# Patient Record
Sex: FEMALE | Race: WHITE | HISPANIC OR LATINO
[De-identification: names, ages, dates, MRNs, and addresses within clinical notes are randomized per-mention and may not be internally consistent; named-entity substitution may affect disease eponyms.]

---

## 2023-04-04 PROBLEM — Z00.00 ENCOUNTER FOR PREVENTIVE HEALTH EXAMINATION: Status: ACTIVE | Noted: 2023-04-04

## 2023-04-13 ENCOUNTER — APPOINTMENT (OUTPATIENT)
Dept: ORTHOPEDIC SURGERY | Facility: CLINIC | Age: 43
End: 2023-04-13
Payer: MEDICAID

## 2023-04-13 VITALS — WEIGHT: 160 LBS | HEIGHT: 65 IN | BODY MASS INDEX: 26.66 KG/M2

## 2023-04-13 DIAGNOSIS — M51.36 OTHER INTERVERTEBRAL DISC DEGENERATION, LUMBAR REGION: ICD-10-CM

## 2023-04-13 DIAGNOSIS — Z78.9 OTHER SPECIFIED HEALTH STATUS: ICD-10-CM

## 2023-04-13 DIAGNOSIS — M47.816 SPONDYLOSIS W/OUT MYELOPATHY OR RADICULOPATHY, LUMBAR REGION: ICD-10-CM

## 2023-04-13 DIAGNOSIS — M67.951 UNSPECIFIED DISORDER OF SYNOVIUM AND TENDON, RIGHT THIGH: ICD-10-CM

## 2023-04-13 PROCEDURE — 72100 X-RAY EXAM L-S SPINE 2/3 VWS: CPT

## 2023-04-13 PROCEDURE — 99204 OFFICE O/P NEW MOD 45 MIN: CPT

## 2023-05-01 ENCOUNTER — APPOINTMENT (OUTPATIENT)
Dept: ORTHOPEDIC SURGERY | Facility: CLINIC | Age: 43
End: 2023-05-01
Payer: MEDICAID

## 2023-05-01 VITALS
SYSTOLIC BLOOD PRESSURE: 106 MMHG | DIASTOLIC BLOOD PRESSURE: 67 MMHG | HEIGHT: 65 IN | WEIGHT: 160 LBS | BODY MASS INDEX: 26.66 KG/M2 | HEART RATE: 81 BPM

## 2023-05-01 DIAGNOSIS — M77.8 OTHER ENTHESOPATHIES, NOT ELSEWHERE CLASSIFIED: ICD-10-CM

## 2023-05-01 PROCEDURE — T1013: CPT

## 2023-05-01 PROCEDURE — 99213 OFFICE O/P EST LOW 20 MIN: CPT

## 2023-05-24 ENCOUNTER — APPOINTMENT (OUTPATIENT)
Dept: ORTHOPEDIC SURGERY | Facility: CLINIC | Age: 43
End: 2023-05-24
Payer: MEDICAID

## 2023-05-24 VITALS
TEMPERATURE: 98.1 F | HEIGHT: 65 IN | DIASTOLIC BLOOD PRESSURE: 75 MMHG | WEIGHT: 158 LBS | SYSTOLIC BLOOD PRESSURE: 118 MMHG | BODY MASS INDEX: 26.33 KG/M2 | HEART RATE: 67 BPM

## 2023-05-24 DIAGNOSIS — M65.4 RADIAL STYLOID TENOSYNOVITIS [DE QUERVAIN]: ICD-10-CM

## 2023-05-24 DIAGNOSIS — G56.03 CARPAL TUNNEL SYNDROM,BILATERAL UPPER LIMBS: ICD-10-CM

## 2023-05-24 DIAGNOSIS — M25.531 PAIN IN RIGHT WRIST: ICD-10-CM

## 2023-05-24 DIAGNOSIS — M25.532 PAIN IN LEFT WRIST: ICD-10-CM

## 2023-05-24 PROCEDURE — 99214 OFFICE O/P EST MOD 30 MIN: CPT | Mod: 25

## 2023-05-24 PROCEDURE — 73110 X-RAY EXAM OF WRIST: CPT | Mod: 50

## 2023-05-24 PROCEDURE — 29125 APPL SHORT ARM SPLINT STATIC: CPT | Mod: 50

## 2023-06-12 ENCOUNTER — TRANSCRIPTION ENCOUNTER (OUTPATIENT)
Age: 43
End: 2023-06-12

## 2023-06-23 ENCOUNTER — APPOINTMENT (OUTPATIENT)
Dept: ORTHOPEDIC SURGERY | Facility: CLINIC | Age: 43
End: 2023-06-23

## 2023-10-24 ENCOUNTER — APPOINTMENT (OUTPATIENT)
Dept: ORTHOPEDIC SURGERY | Facility: CLINIC | Age: 43
End: 2023-10-24
Payer: MEDICAID

## 2023-10-24 DIAGNOSIS — M18.11 UNILATERAL PRIMARY OSTEOARTHRITIS OF FIRST CARPOMETACARPAL JOINT, RIGHT HAND: ICD-10-CM

## 2023-10-24 DIAGNOSIS — M75.21 BICIPITAL TENDINITIS, RIGHT SHOULDER: ICD-10-CM

## 2023-10-24 PROCEDURE — 99203 OFFICE O/P NEW LOW 30 MIN: CPT

## 2023-10-30 ENCOUNTER — APPOINTMENT (OUTPATIENT)
Dept: ORTHOPEDIC SURGERY | Facility: CLINIC | Age: 43
End: 2023-10-30
Payer: MEDICAID

## 2023-10-30 DIAGNOSIS — M70.61 TROCHANTERIC BURSITIS, RIGHT HIP: ICD-10-CM

## 2023-10-30 DIAGNOSIS — M22.2X1 PATELLOFEMORAL DISORDERS, RIGHT KNEE: ICD-10-CM

## 2023-10-30 PROCEDURE — 99204 OFFICE O/P NEW MOD 45 MIN: CPT

## 2023-10-30 PROCEDURE — 73502 X-RAY EXAM HIP UNI 2-3 VIEWS: CPT

## 2023-10-30 PROCEDURE — 73564 X-RAY EXAM KNEE 4 OR MORE: CPT | Mod: RT

## 2023-11-12 PROBLEM — M18.11 ARTHRITIS OF CARPOMETACARPAL (CMC) JOINT OF RIGHT THUMB: Status: ACTIVE | Noted: 2023-10-24

## 2023-11-12 PROBLEM — M75.21 BICEPS TENDINITIS OF RIGHT UPPER EXTREMITY: Status: ACTIVE | Noted: 2023-10-24

## 2023-12-18 ENCOUNTER — APPOINTMENT (OUTPATIENT)
Dept: ORTHOPEDIC SURGERY | Facility: CLINIC | Age: 43
End: 2023-12-18

## 2024-04-17 ENCOUNTER — APPOINTMENT (OUTPATIENT)
Dept: ORTHOPEDIC SURGERY | Facility: CLINIC | Age: 44
End: 2024-04-17
Payer: MEDICAID

## 2024-04-17 DIAGNOSIS — Z00.00 ENCOUNTER FOR GENERAL ADULT MEDICAL EXAMINATION W/OUT ABNORMAL FINDINGS: ICD-10-CM

## 2024-04-17 DIAGNOSIS — M47.816 SPONDYLOSIS W/OUT MYELOPATHY OR RADICULOPATHY, LUMBAR REGION: ICD-10-CM

## 2024-04-17 DIAGNOSIS — M54.41 LUMBAGO WITH SCIATICA, RIGHT SIDE: ICD-10-CM

## 2024-04-17 PROCEDURE — 99214 OFFICE O/P EST MOD 30 MIN: CPT

## 2024-04-17 PROCEDURE — 72100 X-RAY EXAM L-S SPINE 2/3 VWS: CPT

## 2024-04-17 PROCEDURE — 73502 X-RAY EXAM HIP UNI 2-3 VIEWS: CPT

## 2024-04-17 RX ORDER — NAPROXEN 500 MG/1
500 TABLET ORAL
Qty: 60 | Refills: 2 | Status: ACTIVE | COMMUNITY
Start: 2024-04-17 | End: 1900-01-01

## 2024-04-17 NOTE — HISTORY OF PRESENT ILLNESS
[de-identified] : The patient is a 43 year old [right] hand dominant F who presents today complaining of radiating pain across back. Numbness on the left side. Pain sitting, lying and standing.  No loss of b/b control. There is right leg subjective weakness. Difficulty going up stairs.  Date of Injury/Onset: 2 years ago  Pain:  At Rest: 9/10 With Activity:  8/10 Mechanism of injury: Pt took a fall in [facility] , slipped on oil on the floor, her right arm and thumb were stuck on shelf and her RLE slipped out from under her. This is [not] a Work Related Injury being treated under Worker's Compensation. This is [not] an athletic injury occurring associated with an interscholastic or organized sports team. Quality of symptoms: sharp pain and numbness that radiates down to hips and pelvis area. Burning pains when laying down  Improves with: PT temporarily, Tylenol and Advil prn Worse with: Getting up in the mornings, crossing of legs while sitting down Prior treatment/ Imaging: December she did PT twice a week, temporary relief.  Out of work/sport:  School/Sport/Position/Occupation:   Additional Information: Hx herniated disc since fall 2 years ago. No hx spine injections or spine surgery.

## 2024-04-17 NOTE — ASSESSMENT
[FreeTextEntry1] : 43F with exacerbation LBP/lumbago, Lumbar facet arthritis, radiculopathy. Reports hx HNP  1) PT for core strengthening, LE pre's 2) TPI and MDP offered 3) Yoga recommended 4) Return 6 weeks, if pain persists MRI, discussed role of spine injections.  5) Lidocaine patches 6) [facility] spine conditioning handout provided

## 2024-04-17 NOTE — IMAGING
[Facet arthropathy] : Facet arthropathy [AP] : anteroposterior [There are no fractures, subluxations or dislocations. No significant abnormalities are seen] : There are no fractures, subluxations or dislocations. No significant abnormalities are seen

## 2024-07-01 ENCOUNTER — APPOINTMENT (OUTPATIENT)
Dept: OBGYN | Facility: CLINIC | Age: 44
End: 2024-07-01
Payer: MEDICAID

## 2024-07-01 VITALS
BODY MASS INDEX: 27.49 KG/M2 | DIASTOLIC BLOOD PRESSURE: 80 MMHG | WEIGHT: 165 LBS | HEIGHT: 65 IN | SYSTOLIC BLOOD PRESSURE: 116 MMHG

## 2024-07-01 DIAGNOSIS — R73.03 PREDIABETES.: ICD-10-CM

## 2024-07-01 DIAGNOSIS — Z12.39 ENCOUNTER FOR OTHER SCREENING FOR MALIGNANT NEOPLASM OF BREAST: ICD-10-CM

## 2024-07-01 DIAGNOSIS — R10.2 PELVIC AND PERINEAL PAIN: ICD-10-CM

## 2024-07-01 DIAGNOSIS — Z12.4 ENCOUNTER FOR SCREENING FOR MALIGNANT NEOPLASM OF CERVIX: ICD-10-CM

## 2024-07-01 DIAGNOSIS — Z83.3 FAMILY HISTORY OF DIABETES MELLITUS: ICD-10-CM

## 2024-07-01 LAB
CARD LOT #: NORMAL
CARD LOT EXP DATE: NORMAL
DATE COLLECTED: NORMAL
DATE COLLECTED: NORMAL
DEVELOPER LOT #: NORMAL
DEVELOPER LOT EXP DATE: NORMAL
HEMOCCULT 2: NEGATIVE
HEMOCCULT SP1 STL QL: NEGATIVE
QUALITY CONTROL: YES
QUALITY CONTROL: YES

## 2024-07-01 PROCEDURE — 99386 PREV VISIT NEW AGE 40-64: CPT

## 2024-07-01 PROCEDURE — 82270 OCCULT BLOOD FECES: CPT

## 2024-07-03 LAB — HPV HIGH+LOW RISK DNA PNL CVX: NOT DETECTED

## 2024-07-08 LAB — CYTOLOGY CVX/VAG DOC THIN PREP: ABNORMAL

## 2024-07-10 ENCOUNTER — APPOINTMENT (OUTPATIENT)
Dept: ORTHOPEDIC SURGERY | Facility: CLINIC | Age: 44
End: 2024-07-10
Payer: MEDICAID

## 2024-07-10 VITALS — HEIGHT: 65 IN | BODY MASS INDEX: 27.49 KG/M2 | WEIGHT: 165 LBS

## 2024-07-10 DIAGNOSIS — M54.41 LUMBAGO WITH SCIATICA, RIGHT SIDE: ICD-10-CM

## 2024-07-10 PROCEDURE — 99214 OFFICE O/P EST MOD 30 MIN: CPT

## 2024-07-19 ENCOUNTER — APPOINTMENT (OUTPATIENT)
Dept: ORTHOPEDIC SURGERY | Facility: CLINIC | Age: 44
End: 2024-07-19

## 2024-07-23 ENCOUNTER — APPOINTMENT (OUTPATIENT)
Dept: ORTHOPEDIC SURGERY | Facility: CLINIC | Age: 44
End: 2024-07-23
Payer: MEDICAID

## 2024-07-23 VITALS — HEIGHT: 65 IN | BODY MASS INDEX: 27.49 KG/M2 | WEIGHT: 165 LBS

## 2024-07-23 DIAGNOSIS — M77.8 OTHER ENTHESOPATHIES, NOT ELSEWHERE CLASSIFIED: ICD-10-CM

## 2024-07-23 PROCEDURE — 99213 OFFICE O/P EST LOW 20 MIN: CPT

## 2024-07-23 NOTE — ASSESSMENT
[FreeTextEntry1] : EXAM RIGHT SHOULDER EXAM +ttp at proximal biceps. Skin intact No muscle atrophy noted Shoulder ROM   Forward flexion to 150; contralateral shoulder 160   Abduction to 150   ER with elbow at side to 45; contralateral shoulder 45   IR to L1 + Owen empty can test + Fajardo impingement test + Speed's test - Apprehension test - Lift-off test - Cross-body adduction test Rotator cuff strength is 4+/5 throughout  Right hand with no swelling nor erythema. Able to make a composite fist, oppose thumb to small finger with good thenar bulk, no intrinsic atrophy. No tenderness at radial styloid or A1 pulley. +grind. Stable thumb MCP with tenderness at thumb CMC. Sensation intact throughout. <2sec cap refill.   ASSESSMENT & PLAN Right biceps strain - reviewed radiographs and pathoanatomy. Discussed management to include NSAIDs prn, PT, CSI and activity modification.  Right thumb CMC arthritis - reviewed radiographs and discussed pathoanatomy with patient. Discussed treatment ladder including NSAIDs, bracing (shown MetaGrip), hand therapy, CSI and basal joint arthroplasty.  F/u 3mo (f/u with spine for right sided C-spine radic)

## 2024-07-23 NOTE — HISTORY OF PRESENT ILLNESS
[de-identified] : 43F, RHD presents right hand/ shoulder pain due [facility] Seen her PCP who ordered an XR. Admits to attending PT, with temporary relief. Admits to numbness, weakness in her shoulder, ROM.   07/23/24: f/u right hand and right shoulder. Patient reports that she is still having pain in her hand and shoulder with exertion and usage. Patient reports that she has been attending PT with relief and would like another script. Currently taking OTC medications with no relief. Reports previous episodes of numbness.

## 2024-08-20 ENCOUNTER — APPOINTMENT (OUTPATIENT)
Dept: ORTHOPEDIC SURGERY | Facility: CLINIC | Age: 44
End: 2024-08-20

## 2024-12-03 ENCOUNTER — APPOINTMENT (OUTPATIENT)
Dept: ORTHOPEDIC SURGERY | Facility: CLINIC | Age: 44
End: 2024-12-03
Payer: MEDICAID

## 2024-12-03 VITALS — WEIGHT: 160 LBS | HEIGHT: 65 IN | BODY MASS INDEX: 26.66 KG/M2

## 2024-12-03 DIAGNOSIS — M54.41 LUMBAGO WITH SCIATICA, RIGHT SIDE: ICD-10-CM

## 2024-12-03 PROCEDURE — 99214 OFFICE O/P EST MOD 30 MIN: CPT

## 2024-12-03 PROCEDURE — 99204 OFFICE O/P NEW MOD 45 MIN: CPT

## 2024-12-03 RX ORDER — MELOXICAM 15 MG/1
15 TABLET ORAL
Qty: 30 | Refills: 0 | Status: ACTIVE | COMMUNITY
Start: 2024-12-03 | End: 1900-01-01

## 2024-12-03 RX ORDER — CYCLOBENZAPRINE HYDROCHLORIDE 10 MG/1
10 TABLET, FILM COATED ORAL
Qty: 30 | Refills: 0 | Status: ACTIVE | COMMUNITY
Start: 2024-12-03 | End: 1900-01-01

## 2024-12-13 ENCOUNTER — NON-APPOINTMENT (OUTPATIENT)
Age: 44
End: 2024-12-13

## 2025-01-14 ENCOUNTER — APPOINTMENT (OUTPATIENT)
Dept: ORTHOPEDIC SURGERY | Facility: CLINIC | Age: 45
End: 2025-01-14
Payer: MEDICAID

## 2025-01-14 VITALS — HEIGHT: 65 IN | BODY MASS INDEX: 26.66 KG/M2 | WEIGHT: 160 LBS

## 2025-01-14 DIAGNOSIS — M54.16 RADICULOPATHY, LUMBAR REGION: ICD-10-CM

## 2025-01-14 DIAGNOSIS — Z78.9 OTHER SPECIFIED HEALTH STATUS: ICD-10-CM

## 2025-01-14 DIAGNOSIS — M51.369: ICD-10-CM

## 2025-01-14 PROCEDURE — 99214 OFFICE O/P EST MOD 30 MIN: CPT

## 2025-01-14 RX ORDER — MELOXICAM 15 MG/1
15 TABLET ORAL
Qty: 30 | Refills: 1 | Status: ACTIVE | COMMUNITY
Start: 2025-01-14 | End: 1900-01-01

## 2025-01-28 ENCOUNTER — APPOINTMENT (OUTPATIENT)
Dept: ORTHOPEDIC SURGERY | Facility: CLINIC | Age: 45
End: 2025-01-28

## 2025-07-22 NOTE — PHYSICAL EXAM
Called patient and left a message to call and schedule at [facility]. Unable to fill requested prescription until follow up scheduled.   [Flexion] : flexion [Extension] : extension [] : patient ambulates without assistive device [de-identified] : 3-4/5